# Patient Record
Sex: MALE | Race: WHITE | NOT HISPANIC OR LATINO | Employment: UNEMPLOYED | URBAN - METROPOLITAN AREA
[De-identification: names, ages, dates, MRNs, and addresses within clinical notes are randomized per-mention and may not be internally consistent; named-entity substitution may affect disease eponyms.]

---
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
## 2022-06-23 ENCOUNTER — OFFICE VISIT (OUTPATIENT)
Dept: INTERNAL MEDICINE | Facility: CLINIC | Age: 22
End: 2022-06-23
Payer: COMMERCIAL

## 2022-06-23 ENCOUNTER — LAB VISIT (OUTPATIENT)
Dept: LAB | Facility: HOSPITAL | Age: 22
End: 2022-06-23
Attending: INTERNAL MEDICINE
Payer: COMMERCIAL

## 2022-06-23 VITALS
OXYGEN SATURATION: 99 % | WEIGHT: 237.19 LBS | BODY MASS INDEX: 31.91 KG/M2 | HEART RATE: 54 BPM | DIASTOLIC BLOOD PRESSURE: 78 MMHG | TEMPERATURE: 97 F | SYSTOLIC BLOOD PRESSURE: 108 MMHG

## 2022-06-23 DIAGNOSIS — Z23 NEED FOR DIPHTHERIA-TETANUS-PERTUSSIS (TDAP) VACCINE: ICD-10-CM

## 2022-06-23 DIAGNOSIS — Z00.00 ROUTINE GENERAL MEDICAL EXAMINATION AT A HEALTH CARE FACILITY: Primary | ICD-10-CM

## 2022-06-23 DIAGNOSIS — Z00.00 ROUTINE GENERAL MEDICAL EXAMINATION AT A HEALTH CARE FACILITY: ICD-10-CM

## 2022-06-23 PROBLEM — M62.82 RHABDOMYOLYSIS: Status: RESOLVED | Noted: 2017-08-09 | Resolved: 2022-06-23

## 2022-06-23 PROBLEM — E87.1 HYPONATREMIA: Status: RESOLVED | Noted: 2017-08-08 | Resolved: 2022-06-23

## 2022-06-23 LAB
ALBUMIN SERPL BCP-MCNC: 4.4 G/DL (ref 3.5–5.2)
ALP SERPL-CCNC: 53 U/L (ref 55–135)
ALT SERPL W/O P-5'-P-CCNC: 59 U/L (ref 10–44)
ANION GAP SERPL CALC-SCNC: 10 MMOL/L (ref 8–16)
AST SERPL-CCNC: 30 U/L (ref 10–40)
BASOPHILS # BLD AUTO: 0.07 K/UL (ref 0–0.2)
BASOPHILS NFR BLD: 1.2 % (ref 0–1.9)
BILIRUB SERPL-MCNC: 0.7 MG/DL (ref 0.1–1)
BUN SERPL-MCNC: 11 MG/DL (ref 6–20)
CALCIUM SERPL-MCNC: 9.5 MG/DL (ref 8.7–10.5)
CHLORIDE SERPL-SCNC: 107 MMOL/L (ref 95–110)
CHOLEST SERPL-MCNC: 271 MG/DL (ref 120–199)
CHOLEST/HDLC SERPL: 6.5 {RATIO} (ref 2–5)
CO2 SERPL-SCNC: 25 MMOL/L (ref 23–29)
CREAT SERPL-MCNC: 1 MG/DL (ref 0.5–1.4)
DIFFERENTIAL METHOD: ABNORMAL
EOSINOPHIL # BLD AUTO: 0.5 K/UL (ref 0–0.5)
EOSINOPHIL NFR BLD: 8.5 % (ref 0–8)
ERYTHROCYTE [DISTWIDTH] IN BLOOD BY AUTOMATED COUNT: 12.8 % (ref 11.5–14.5)
EST. GFR  (AFRICAN AMERICAN): >60 ML/MIN/1.73 M^2
EST. GFR  (NON AFRICAN AMERICAN): >60 ML/MIN/1.73 M^2
GLUCOSE SERPL-MCNC: 86 MG/DL (ref 70–110)
HCT VFR BLD AUTO: 47.3 % (ref 40–54)
HDLC SERPL-MCNC: 42 MG/DL (ref 40–75)
HDLC SERPL: 15.5 % (ref 20–50)
HGB BLD-MCNC: 15.4 G/DL (ref 14–18)
IMM GRANULOCYTES # BLD AUTO: 0 K/UL (ref 0–0.04)
IMM GRANULOCYTES NFR BLD AUTO: 0 % (ref 0–0.5)
LDLC SERPL CALC-MCNC: 184.4 MG/DL (ref 63–159)
LYMPHOCYTES # BLD AUTO: 2.4 K/UL (ref 1–4.8)
LYMPHOCYTES NFR BLD: 41.4 % (ref 18–48)
MCH RBC QN AUTO: 30.3 PG (ref 27–31)
MCHC RBC AUTO-ENTMCNC: 32.6 G/DL (ref 32–36)
MCV RBC AUTO: 93 FL (ref 82–98)
MONOCYTES # BLD AUTO: 0.4 K/UL (ref 0.3–1)
MONOCYTES NFR BLD: 7.1 % (ref 4–15)
NEUTROPHILS # BLD AUTO: 2.4 K/UL (ref 1.8–7.7)
NEUTROPHILS NFR BLD: 41.8 % (ref 38–73)
NONHDLC SERPL-MCNC: 229 MG/DL
NRBC BLD-RTO: 0 /100 WBC
PLATELET # BLD AUTO: 337 K/UL (ref 150–450)
PMV BLD AUTO: 9.7 FL (ref 9.2–12.9)
POTASSIUM SERPL-SCNC: 4.4 MMOL/L (ref 3.5–5.1)
PROT SERPL-MCNC: 7.1 G/DL (ref 6–8.4)
RBC # BLD AUTO: 5.08 M/UL (ref 4.6–6.2)
SODIUM SERPL-SCNC: 142 MMOL/L (ref 136–145)
TRIGL SERPL-MCNC: 223 MG/DL (ref 30–150)
TSH SERPL DL<=0.005 MIU/L-ACNC: 2.13 UIU/ML (ref 0.4–4)
WBC # BLD AUTO: 5.77 K/UL (ref 3.9–12.7)

## 2022-06-23 PROCEDURE — 3074F PR MOST RECENT SYSTOLIC BLOOD PRESSURE < 130 MM HG: ICD-10-PCS | Mod: CPTII,S$GLB,, | Performed by: INTERNAL MEDICINE

## 2022-06-23 PROCEDURE — 99999 PR PBB SHADOW E&M-EST. PATIENT-LVL III: CPT | Mod: PBBFAC,,, | Performed by: INTERNAL MEDICINE

## 2022-06-23 PROCEDURE — 3078F DIAST BP <80 MM HG: CPT | Mod: CPTII,S$GLB,, | Performed by: INTERNAL MEDICINE

## 2022-06-23 PROCEDURE — 80061 LIPID PANEL: CPT | Performed by: INTERNAL MEDICINE

## 2022-06-23 PROCEDURE — 3008F PR BODY MASS INDEX (BMI) DOCUMENTED: ICD-10-PCS | Mod: CPTII,S$GLB,, | Performed by: INTERNAL MEDICINE

## 2022-06-23 PROCEDURE — 90471 IMMUNIZATION ADMIN: CPT | Mod: S$GLB,,, | Performed by: INTERNAL MEDICINE

## 2022-06-23 PROCEDURE — 85025 COMPLETE CBC W/AUTO DIFF WBC: CPT | Performed by: INTERNAL MEDICINE

## 2022-06-23 PROCEDURE — 99385 PREV VISIT NEW AGE 18-39: CPT | Mod: 25,S$GLB,, | Performed by: INTERNAL MEDICINE

## 2022-06-23 PROCEDURE — 86803 HEPATITIS C AB TEST: CPT | Performed by: INTERNAL MEDICINE

## 2022-06-23 PROCEDURE — 3008F BODY MASS INDEX DOCD: CPT | Mod: CPTII,S$GLB,, | Performed by: INTERNAL MEDICINE

## 2022-06-23 PROCEDURE — 90715 TDAP VACCINE 7 YRS/> IM: CPT | Mod: S$GLB,,, | Performed by: INTERNAL MEDICINE

## 2022-06-23 PROCEDURE — 3074F SYST BP LT 130 MM HG: CPT | Mod: CPTII,S$GLB,, | Performed by: INTERNAL MEDICINE

## 2022-06-23 PROCEDURE — 87389 HIV-1 AG W/HIV-1&-2 AB AG IA: CPT | Performed by: INTERNAL MEDICINE

## 2022-06-23 PROCEDURE — 3078F PR MOST RECENT DIASTOLIC BLOOD PRESSURE < 80 MM HG: ICD-10-PCS | Mod: CPTII,S$GLB,, | Performed by: INTERNAL MEDICINE

## 2022-06-23 PROCEDURE — 1159F PR MEDICATION LIST DOCUMENTED IN MEDICAL RECORD: ICD-10-PCS | Mod: CPTII,S$GLB,, | Performed by: INTERNAL MEDICINE

## 2022-06-23 PROCEDURE — 99999 PR PBB SHADOW E&M-EST. PATIENT-LVL III: ICD-10-PCS | Mod: PBBFAC,,, | Performed by: INTERNAL MEDICINE

## 2022-06-23 PROCEDURE — 36415 COLL VENOUS BLD VENIPUNCTURE: CPT | Performed by: INTERNAL MEDICINE

## 2022-06-23 PROCEDURE — 90715 TDAP VACCINE GREATER THAN OR EQUAL TO 7YO IM: ICD-10-PCS | Mod: S$GLB,,, | Performed by: INTERNAL MEDICINE

## 2022-06-23 PROCEDURE — 99385 PR PREVENTIVE VISIT,NEW,18-39: ICD-10-PCS | Mod: 25,S$GLB,, | Performed by: INTERNAL MEDICINE

## 2022-06-23 PROCEDURE — 1159F MED LIST DOCD IN RCRD: CPT | Mod: CPTII,S$GLB,, | Performed by: INTERNAL MEDICINE

## 2022-06-23 PROCEDURE — 90471 TDAP VACCINE GREATER THAN OR EQUAL TO 7YO IM: ICD-10-PCS | Mod: S$GLB,,, | Performed by: INTERNAL MEDICINE

## 2022-06-23 PROCEDURE — 84443 ASSAY THYROID STIM HORMONE: CPT | Performed by: INTERNAL MEDICINE

## 2022-06-23 PROCEDURE — 80053 COMPREHEN METABOLIC PANEL: CPT | Performed by: INTERNAL MEDICINE

## 2022-06-23 NOTE — PROGRESS NOTES
Subjective:      Patient ID: Arnulfo Cox is a 22 y.o. male.    Chief Complaint: Establish Care    HPI     23 yo with   Patient Active Problem List   Diagnosis    Body mass index, pediatric, greater than or equal to 95th percentile for age     Past Medical History:   Diagnosis Date    Asthma     inactive    Attention or concentration deficit     Enamel hypoplasia     Hyponatremia 8/8/2017    Polyp of colon     s/p polypectomy - beingn    Rhabdomyolysis     Rhabdomyolysis 8/9/2017    Tonsillitis, chronic     missed 20 days of school [id number], exam by ENT negative, allergy testing negative    Vertigo     seen by neurology, Benign positional vertigo with suspected behavioral amplification- possible migraines.     Here today for annual prev exam.  Compliant with meds without significant side effects. Energy and appetite are good.     Past Surgical History:   Procedure Laterality Date    CIRCUMCISION       Social History     Socioeconomic History    Marital status: Single   Tobacco Use    Smoking status: Never Smoker    Smokeless tobacco: Never Used   Substance and Sexual Activity    Alcohol use: No    Drug use: No   Social History Narrative    Parents both physicians.    FH: donor egg, dad's sperm    SH:Lives with mom and sister, 1 dog, 1 cat, 2 rabbits, no smokers             family history includes ADD / ADHD in his father; Diabetes in his father and paternal grandfather.    No f/h colon nor prostate cancer.     Review of Systems   Constitutional: Negative for chills and fever.   HENT: Negative for ear pain and sore throat.    Respiratory: Negative for cough.    Cardiovascular: Negative for chest pain.   Gastrointestinal: Negative for abdominal pain and blood in stool.   Genitourinary: Negative for dysuria and hematuria.   Neurological: Negative for seizures and syncope.     Objective:   /78 (BP Location: Left arm, Patient Position: Sitting, BP Method: Large (Manual))   Pulse (!) 54    Temp 96.8 °F (36 °C) (Tympanic)   Wt 107.6 kg (237 lb 3.4 oz)   SpO2 99%   BMI 31.91 kg/m²     Physical Exam  Constitutional:       General: He is not in acute distress.     Appearance: He is well-developed.   HENT:      Head: Normocephalic and atraumatic.   Eyes:      Pupils: Pupils are equal, round, and reactive to light.   Neck:      Thyroid: No thyromegaly.   Cardiovascular:      Rate and Rhythm: Normal rate and regular rhythm.   Pulmonary:      Breath sounds: Normal breath sounds. No wheezing or rales.   Abdominal:      General: Bowel sounds are normal.      Palpations: Abdomen is soft.      Tenderness: There is no abdominal tenderness.   Musculoskeletal:      Cervical back: Neck supple.   Lymphadenopathy:      Cervical: No cervical adenopathy.   Skin:     General: Skin is warm and dry.   Neurological:      Mental Status: He is alert and oriented to person, place, and time.   Psychiatric:         Behavior: Behavior normal.         Assessment:     1. Routine general medical examination at a health care facility    2. Need for diphtheria-tetanus-pertussis (Tdap) vaccine      Plan:   Routine general medical examination at a health care facility  -     Comprehensive Metabolic Panel; Future; Expected date: 06/23/2022  -     CBC Auto Differential; Future; Expected date: 06/23/2022  -     TSH; Future; Expected date: 06/23/2022  -     Lipid Panel; Future; Expected date: 06/23/2022  -     Hepatitis C Antibody; Future; Expected date: 06/23/2022  -     HIV 1/2 Ag/Ab (4th Gen); Future; Expected date: 06/23/2022    Need for diphtheria-tetanus-pertussis (Tdap) vaccine  -     Tdap Vaccine      Heart healthy diet and reg exercise   reviewed        Problem List Items Addressed This Visit    None     Visit Diagnoses     Routine general medical examination at a health care facility    -  Primary    Relevant Orders    Comprehensive Metabolic Panel    CBC Auto Differential    TSH    Lipid Panel    Hepatitis C Antibody    HIV  1/2 Ag/Ab (4th Gen)    Need for diphtheria-tetanus-pertussis (Tdap) vaccine        Relevant Orders    Tdap Vaccine (Completed)          Follow up in about 1 year (around 6/23/2023), or if symptoms worsen or fail to improve.

## 2022-06-24 ENCOUNTER — PATIENT MESSAGE (OUTPATIENT)
Dept: INTERNAL MEDICINE | Facility: CLINIC | Age: 22
End: 2022-06-24
Payer: COMMERCIAL

## 2022-06-24 ENCOUNTER — TELEPHONE (OUTPATIENT)
Dept: INTERNAL MEDICINE | Facility: CLINIC | Age: 22
End: 2022-06-24
Payer: COMMERCIAL

## 2022-06-24 DIAGNOSIS — R41.840 ATTENTION DEFICIT: Primary | ICD-10-CM

## 2022-06-24 LAB
HCV AB SERPL QL IA: NEGATIVE
HIV 1+2 AB+HIV1 P24 AG SERPL QL IA: NEGATIVE

## 2022-06-24 NOTE — TELEPHONE ENCOUNTER
----- Message from Margarita Hernandez sent at 6/24/2022  1:52 PM CDT -----  Contact: self/ [phone]  Pt request referral for psych.Please call back at [phone].Thanks ar

## 2022-07-05 ENCOUNTER — TELEPHONE (OUTPATIENT)
Dept: PSYCHIATRY | Facility: CLINIC | Age: 22
End: 2022-07-05
Payer: COMMERCIAL

## 2022-07-05 NOTE — TELEPHONE ENCOUNTER
----- Message from Ediliadebora Francis sent at 7/5/2022  1:15 PM CDT -----  Contact: NICKI SMALLWOOD [[id number]]  .Type:  Needs Medical Advice    Who Called: NICKI SMALLWOOD [[id number]]  Would the patient rather a call back or a response via [facility]? Call   Best Call Back Number: .[phone] (home)    Additional Information: Pt is req a call back in regards to having an appointment scheduled to be screened for ADHD there is a referral for pcp.. Thanks AW

## 2023-02-22 NOTE — ED NOTES
Theo was seen today for fever.    Diagnoses and all orders for this visit:    Tonsillitis  -     Streptococcus A Rapid Screen w/Reflex to PCR - Clinic Collect  -     Group A Streptococcus PCR Throat Swab       I discussed with the patient and parent(s) that this likely viral illness does not require antibiotic treatment. There is no evidence of Strep throat or other serious bacterial infection on history, exam and testing today. Mom declines COVID swab. The child is well-hydrated and nontoxic in appearance. Supportive treatment is recommended, including Tylenol and/or Ibuprofen as needed for fever or pain, push fluids and monitor hydration. The patient and parent(s) are encouraged to call the clinic or the 24-hour nurse hotline with any questions or concerns.      Subjective   Theo is a 6 year old accompanied by his mother, presenting for the following health issues:  Fever      Fever  Associated symptoms include a fever.   History of Present Illness       Reason for visit:  Fever  Symptom onset:  Today  Symptoms include:  Fever this morning, no cough, no earache, no runny nose, lymph nodes on left side enlarged and painful when touching  Symptom intensity:  Moderate  Symptom progression:  Staying the same  What makes it better:  Tylenol      Fever noted this morning, resolved with Tylenol.   Cervical nodes are enlarged, tender.   Eating, drinking well. No vomiting.         Review of Systems   Constitutional: Positive for fever.   Remainder of 10-system review is normal other than as noted above.          Objective    BP 98/56   Pulse 104   Temp 99.9  F (37.7  C) (Temporal)   Resp 22   Wt 17.4 kg (38 lb 6.4 oz)   SpO2 100%   2 %ile (Z= -2.11) based on CDC (Boys, 2-20 Years) weight-for-age data using vitals from 2/22/2023.  No height on file for this encounter.    Physical Exam   GENERAL: Active, alert, in no acute distress.  SKIN: Clear. No significant rash, abnormal pigmentation or lesions  HEAD:  APPEARANCE: Pt appears anxious. Patient has clean hair, skin and nails. Clothing is appropriate and properly fastened.   NEURO: Awake, alert, appropriate for age, and cooperative with a calm affect; pupils equal and round.  HEENT: Head symmetrical. Bilateral eyes without redness or drainage. Bilateral ears without drainage. Bilateral nares patent without drainage.  CARDIAC: Regular rate, hypertensive.  RESPIRATORY: Airway is open and patent. Respirations are spontaneous on room air. Normal respiratory effort and rate noted.  GI/: Abdomen soft and non-distended. Patient is reported to vomit multiple times today. Pt reports decreased urine output.  NEUROVASCULAR: All extremities are cool and pink with +2 pulses and capillary refill less than 3 seconds.  MUSCULOSKELETAL: Moves all extremities well, needs assistance with ambulation and standing. Arthralgia and myalgia noted.  SKIN: Warm and dry, adequate turgor, mucus membranes moist and pink; no breakdown, lesions, or ecchymosis noted.   SOCIAL: Patient is accompanied by father and pt's .   Will continue to monitor.     Normocephalic.  EYES:  No discharge or erythema. Normal pupils and EOM.  EARS: Normal canals. Tympanic membranes are normal; gray and translucent.  NOSE: Normal without discharge.  MOUTH/THROAT: erythema on the oropharynx, tonsillar hypertrophy 2+.   NECK: Supple, no masses.  LYMPH NODES: R anterior nontender cervical adenopathy.   LUNGS: Clear. No rales, rhonchi, wheezing or retractions  HEART: Regular rhythm. Normal S1/S2. No murmurs.  ABDOMEN: Soft, non-tender, not distended, no masses or hepatosplenomegaly. Bowel sounds normal.     Diagnostics:   Recent Results (from the past 24 hour(s))   Streptococcus A Rapid Screen w/Reflex to PCR - Clinic Collect    Collection Time: 02/22/23  1:06 PM    Specimen: Throat; Swab   Result Value Ref Range    Group A Strep antigen Negative Negative

## 2023-06-23 ENCOUNTER — TELEPHONE (OUTPATIENT)
Dept: INTERNAL MEDICINE | Facility: CLINIC | Age: 23
End: 2023-06-23
Payer: COMMERCIAL

## 2023-07-28 ENCOUNTER — PATIENT OUTREACH (OUTPATIENT)
Dept: ADMINISTRATIVE | Facility: HOSPITAL | Age: 23
End: 2023-07-28
Payer: COMMERCIAL

## 2023-11-02 ENCOUNTER — PATIENT OUTREACH (OUTPATIENT)
Dept: ADMINISTRATIVE | Facility: HOSPITAL | Age: 23
End: 2023-11-02
Payer: COMMERCIAL

## 2023-11-02 ENCOUNTER — PATIENT MESSAGE (OUTPATIENT)
Dept: ADMINISTRATIVE | Facility: HOSPITAL | Age: 23
End: 2023-11-02
Payer: COMMERCIAL

## 2024-01-18 ENCOUNTER — PATIENT OUTREACH (OUTPATIENT)
Dept: ADMINISTRATIVE | Facility: HOSPITAL | Age: 24
End: 2024-01-18
Payer: COMMERCIAL

## 2024-01-18 NOTE — PROGRESS NOTES
PCP VISIT > 12 MONTHS: per chart review pt is overdue for annual visit, spoke to pt he has Moved to [place] and will be looking for another provider.

## 2024-10-10 ENCOUNTER — OFFICE VISIT (OUTPATIENT)
Dept: PRIMARY CARE CLINIC | Facility: CLINIC | Age: 24
End: 2024-10-10
Payer: COMMERCIAL

## 2024-10-10 VITALS
OXYGEN SATURATION: 99 % | BODY MASS INDEX: 29.77 KG/M2 | HEIGHT: 72 IN | SYSTOLIC BLOOD PRESSURE: 130 MMHG | TEMPERATURE: 98 F | HEART RATE: 60 BPM | RESPIRATION RATE: 20 BRPM | DIASTOLIC BLOOD PRESSURE: 78 MMHG | WEIGHT: 219.81 LBS

## 2024-10-10 DIAGNOSIS — Z00.00 ROUTINE MEDICAL EXAM: Primary | ICD-10-CM

## 2024-10-10 DIAGNOSIS — R00.2 HEART PALPITATIONS: ICD-10-CM

## 2024-10-10 PROCEDURE — 93010 ELECTROCARDIOGRAM REPORT: CPT | Mod: S$GLB,,, | Performed by: INTERNAL MEDICINE

## 2024-10-10 PROCEDURE — 93005 ELECTROCARDIOGRAM TRACING: CPT | Mod: S$GLB,,, | Performed by: NURSE PRACTITIONER

## 2024-10-10 PROCEDURE — 99999 PR PBB SHADOW E&M-EST. PATIENT-LVL IV: CPT | Mod: PBBFAC,,, | Performed by: NURSE PRACTITIONER

## 2024-10-10 RX ORDER — PROPRANOLOL HYDROCHLORIDE 10 MG/1
10 TABLET ORAL DAILY PRN
COMMUNITY

## 2024-10-10 NOTE — PROGRESS NOTES
"[facility] Note    Chief Complaint      Chief Complaint   Patient presents with    Annual Exam    Establish Care       History of Present Illness      Arnulfo Cox is a 24 y.o. male who presents today for   Chief Complaint   Patient presents with    Annual Exam    Establish Care         Patient is new to me.  He presents to clinic for follow-up visit after visiting the ER on 10/09/24 for her palpitations.  Unable to view the EKG from this visit as it is not available to be seen.  CBC and CMP were normal.  Patient reports palpitations or feeling of "heart dropping" began proximally 5 days ago.  He reports drinking alcohol the evening before but had been hydrating since.  He denies any trauma to his chest.  He denies any syncope, vomiting, nausea.  He denies any shortness a breath at this time.  He reports feeling an overall sense of weakness.         Review of Systems   Constitutional:  Positive for malaise/fatigue.   Cardiovascular:  Positive for palpitations.   All 12 systems otherwise negative.       Family History:  family history includes ADD / ADHD in his father; Dementia in his maternal grandfather; Diabetes in his father; No Known Problems in his maternal grandmother, paternal grandfather, and paternal grandmother; Parkinsonism in his mother.   Family history was reviewed with patient.     Medications:  Outpatient Encounter Medications as of 10/10/2024   Medication Sig Dispense Refill    propranoloL (INDERAL) 10 MG tablet Take 10 mg by mouth daily as needed.      dexmethylphenidate (FOCALIN XR) 15 MG 24 hr capsule Take 1 capsule by mouth in the morning (Patient not taking: Reported on 10/10/2024) 30 capsule 0     No facility-administered encounter medications on file as of 10/10/2024.       Allergies:  Review of patient's allergies indicates:  No Known Allergies    Health Maintenance:  Health Maintenance   Topic Date Due    TETANUS VACCINE  06/14/2034    DTaP/Tdap/Td Vaccines (9 - Td or " Tdap) 06/14/2034    Hepatitis C Screening  Completed    Hib Vaccines  Completed    Hepatitis B Vaccines  Completed    IPV Vaccines  Completed    Lipid Panel  Completed    Hepatitis A Vaccines  Completed    MMR Vaccines  Completed    Varicella Vaccines  Completed    Meningococcal Vaccine  Completed    HPV Vaccines  Completed     Health Maintenance Topics with due status: Not Due       Topic Last Completion Date    TETANUS VACCINE 06/14/2024    DTaP/Tdap/Td Vaccines 06/14/2024    RSV Vaccine (Age 60+ and Pregnant patients) Not Due       Physical Exam      Vital Signs  Temp: 97.6 °F (36.4 °C)  Temp Source: Oral  Pulse: 60  Resp: 20  SpO2: 99 %  BP: 130/78  Pain Score: 0-No pain  Height and Weight  Height: 6' (182.9 cm)  Weight: 99.7 kg (219 lb 12.8 oz)  BSA (Calculated - sq m): 2.25 sq meters  BMI (Calculated): 29.8  Weight in (lb) to have BMI = 25: 183.9]    Physical Exam  Vitals reviewed.   Constitutional:       Appearance: Normal appearance. He is normal weight.   HENT:      Head: Normocephalic and atraumatic.      Right Ear: Tympanic membrane, ear canal and external ear normal.      Left Ear: Tympanic membrane, ear canal and external ear normal.      Nose: Nose normal.      Mouth/Throat:      Mouth: Mucous membranes are moist.      Pharynx: Oropharynx is clear.   Eyes:      Extraocular Movements: Extraocular movements intact.      Conjunctiva/sclera: Conjunctivae normal.      Pupils: Pupils are equal, round, and reactive to light.   Cardiovascular:      Rate and Rhythm: Normal rate and regular rhythm.      Pulses: Normal pulses.      Heart sounds: Normal heart sounds.   Pulmonary:      Effort: Pulmonary effort is normal.      Breath sounds: Normal breath sounds.   Abdominal:      General: Abdomen is flat. Bowel sounds are normal.      Palpations: Abdomen is soft.   Musculoskeletal:         General: Normal range of motion.      Cervical back: Normal range of motion and neck supple.   Skin:     General: Skin is warm  and dry.      Capillary Refill: Capillary refill takes less than 2 seconds.   Neurological:      General: No focal deficit present.      Mental Status: He is alert and oriented to person, place, and time. Mental status is at baseline.   Psychiatric:         Mood and Affect: Mood normal.         Behavior: Behavior normal.         Thought Content: Thought content normal.         Judgment: Judgment normal.            Assessment/Plan     Arnulfo Cox is a 24 y.o.male with:    Routine medical exam  -     Lipid Panel; Future; Expected date: 10/10/2024  -     Hemoglobin A1C; Future; Expected date: 10/10/2024  -     T4, Free; Future; Expected date: 10/10/2024  -     TSH; Future; Expected date: 10/10/2024    Heart palpitations  -     Stress Echo Which stress agent will be used? Treadmill Exercise; Color Flow Doppler? No; Future  -     Ambulatory referral/consult to Cardiology; Future; Expected date: 10/17/2024  -     IN OFFICE EKG 12-LEAD (to Muse)        As above, continue current medications and maintain follow up with specialists.  Return to clinic as needed.    Greater than 50% of visit was spent face to face with patient.  All questions were answered to patient's satisfaction.          Karen L Spencer, NP-C [facility]

## 2024-10-11 ENCOUNTER — PATIENT MESSAGE (OUTPATIENT)
Dept: PRIMARY CARE CLINIC | Facility: CLINIC | Age: 24
End: 2024-10-11
Payer: COMMERCIAL

## 2024-10-11 LAB
OHS QRS DURATION: 112 MS
OHS QTC CALCULATION: 375 MS

## 2024-10-11 NOTE — TELEPHONE ENCOUNTER
Pt has an appt with Cardiology on 10/25, But had another episode today. He wants to get a 24 hr Holter monitor or does he just wait for his appt with the cardiologist ?

## 2024-10-14 DIAGNOSIS — E78.2 MIXED DYSLIPIDEMIA: Primary | ICD-10-CM

## 2024-10-25 ENCOUNTER — OFFICE VISIT (OUTPATIENT)
Dept: CARDIOLOGY | Facility: CLINIC | Age: 24
End: 2024-10-25
Payer: COMMERCIAL

## 2024-10-25 ENCOUNTER — CLINICAL SUPPORT (OUTPATIENT)
Dept: CARDIOLOGY | Facility: HOSPITAL | Age: 24
End: 2024-10-25
Attending: INTERNAL MEDICINE
Payer: COMMERCIAL

## 2024-10-25 VITALS
BODY MASS INDEX: 29.48 KG/M2 | DIASTOLIC BLOOD PRESSURE: 88 MMHG | WEIGHT: 217.38 LBS | SYSTOLIC BLOOD PRESSURE: 133 MMHG | HEART RATE: 65 BPM

## 2024-10-25 DIAGNOSIS — R00.2 HEART PALPITATIONS: Primary | ICD-10-CM

## 2024-10-25 DIAGNOSIS — R00.2 HEART PALPITATIONS: ICD-10-CM

## 2024-10-25 DIAGNOSIS — R00.2 PALPITATIONS: ICD-10-CM

## 2024-10-25 PROCEDURE — 99999 PR PBB SHADOW E&M-EST. PATIENT-LVL III: CPT | Mod: PBBFAC,,, | Performed by: INTERNAL MEDICINE

## 2024-10-25 PROCEDURE — 93242 EXT ECG>48HR<7D RECORDING: CPT

## 2024-10-25 PROCEDURE — 93244 EXT ECG>48HR<7D REV&INTERPJ: CPT | Mod: ,,, | Performed by: INTERNAL MEDICINE

## 2024-10-25 RX ORDER — LANOLIN ALCOHOL/MO/W.PET/CERES
CREAM (GRAM) TOPICAL
Start: 2024-10-25

## 2024-10-25 NOTE — PROGRESS NOTES
Subjective:   10/25/2024     Patient ID:  Arnulfo Cox is a 24 y.o. male who presents for evaulation of Palpitations      Patient comes in with palpitations, he notes these are skipped beats, they began several weeks ago.  He was seen in the emergency room.  Workup there was unremarkable.  Studies reviewed.  He was seen by his PCP, thyroid function studies normal.  He was scheduled for a stress echo.    Family history is negative for sudden death, positive hypertension.    Fairly marked hyperlipidemia present.  LDL cholesterol previously 184.    He is physically active, runs daily.      ER workup:     History obtained from patient  This is a 24 y.o. male presents with Palpitations  . Patient presents with palpitations that began this weekend. He describes pauses in his heartbeat. He states when he had these pauses he felt short of breath. This occurred Saturday. He would not have any episodes Sunday or Monday however had episodes again today and felt almost near syncopal with it so he opted to come to the emergency department. He does not have a primary care doctor but with seeking to find 1 when they told him to come to the emergency department. He denies any chest pain. No shortness of breath this time.    Recent Results (from the past 24 hour(s))  EKG 12 Lead - Palpitations  Collection Time: 10/09/24 8:25 PM  Result Value Ref Range  VENTRICULAR RATE 98 BPM  ATRIAL RATE 98 BPM  P-R INTERVAL 162 ms  QRS DURATION 106 ms  Q-T INTERVAL 368 ms  QTC CALCULATION(BEZET) 469 ms  P AXIS 66 degrees  R AXIS 21 degrees  T AXIS 48 degrees  INTERPRETATION (MUSE)  Normal sinus rhythm with sinus arrhythmia Normal ECG No previous ECGs available Confirmed by Mohsen, Ala ([id number]) on 10/10/2024 4:54:58 PM  CBC (no diff)  Collection Time: 10/09/24 8:43 PM  Result Value Ref Range  WBC 9.7 4.5 - 11.0 103/uL  RBC 4.85 4.50 - 5.90 106/uL  Hemoglobin 15.0 13.5 - 17.5 gm/dL  Hematocrit 43.3 40.0 - 51.0 %  MCV 89.4 80.0 - 100.0 fL  MCH  30.9 26.0 - 34.0 pg  MCHC 34.6 31.0 - <37.0 g/dL  RDW 13.1 11.5 - 14.5 %  Platelet Count 304 130 - 400 103/uL  MPV 7.7 7.4 - 10.4 fL  MDW  CMP  Collection Time: 10/09/24 8:43 PM  Result Value Ref Range  Sodium 139 135 - 146 mmol/L  Potassium 3.6 3.6 - 5.2 mmol/L  Chloride 102 96 - 110 mmol/L  Carbon Dioxide 26 24 - 32 mmol/L  Glucose 104 (H) 65 - 99 mg/dL  Calcium 9.2 8.4 - 10.3 mg/dL  BUN 13.0 7.0 - 25.0 mg/dL  Creatinine 1.16 0.70 - 1.40 mg/dL  Total Protein 7.3 6.0 - 8.0 g/dL  Albumin 4.8 3.4 - 5.0 g/dL  AST 26 <45 U/L  ALT 23 <46 U/L  Alkaline Phosphatase 53 20 - 120 U/L  Bilirubin, Total 0.8 <1.3 mg/dL  EGFR 90 >=90 mL/min/1.73m2  Anion Gap 11 8 - 16  Magnesium  Collection Time: 10/09/24 8:43 PM  Result Value Ref Range  Magnesium 2.2 1.5 - 2.6 mg/dL  D-Dimer, Quantitative  Collection Time: 10/09/24 8:43 PM  Result Value Ref Range  D-Dimer 0.27 <0.50 ug/mL FEU    Medications - No data to display    Imaging: the following images were reviewed and interpreted by the radiologist    XR Chest 1 View Portable  Final Result  Impression: Enlarged cardiac silhouette, cardiomegaly versus artifact from portable technique. No acute pulmonary disease.    Electronically Signed By: Juan M Blackman MD 10/9/2024 9:09 PM CDT      Procedures    MDM    Medical Decision Making    This is an emergent evaluation of a 24 y.o. year old who presents to the emergency department with palpitations. EKG is sinus rhythm with sinus arrhythmia. Troponin is negative. D-dimer is negative. He is hypertensive here. No reported history. Needs to follow up regarding this. Creatinine 1.16 without previous. Patient is following up with PCP.    Previous records were queried and reviewed. Including office note 10/24/2023 for anxiety disorder    Differential diagnosis includes but is not limited to: Life-threatening arrhythmia sinus arrhythmia sinus pause PE    Pulse oximetry obtained and was: 99 %. I interpreted that as normal.    Cardiac monitor shows  NSR at 103 which I interpret to be normal. Monitoring ordered to evaluate for arrhythmias. No ectopy.    Labs were significant for:    Recent Labs  10/09/24  2043  HGB 15.0  HEMATOCRIT 43.3  WBC 9.7  CREATININE 1.16    Imaging: I independently interpreted the x-ray images which showed no consolidation        ED Course    ED Course as of 10/10/24 1745  Wed Oct 09, 2024  2034 EKG interpreted by me normal sinus rhythm at a rate of 98   no ST changes [LH]  2133 D Dimer: 0.27 [LH]    ED Course User Index  [LH] Trisha Davis MD      Clinical Impressions as of 10/10/24 1745  Sinus arrhythmia  Palpitations        Impression    1. Sinus arrhythmia  2. Palpitations          Past Medical History:   Diagnosis Date    Asthma     inactive    Attention or concentration deficit     Enamel hypoplasia     Hyponatremia 8/8/2017    Polyp of colon     s/p polypectomy - beingn    Rhabdomyolysis     Rhabdomyolysis 8/9/2017    Tonsillitis, chronic     missed 20 days of school [id number], exam by ENT negative, allergy testing negative    Vertigo     seen by neurology, Benign positional vertigo with suspected behavioral amplification- possible migraines.       Review of patient's allergies indicates:  No Known Allergies      Current Outpatient Medications:     propranoloL (INDERAL) 10 MG tablet, Take 10 mg by mouth daily as needed., Disp: , Rfl:      Objective:   Review of Systems   Cardiovascular:  Positive for irregular heartbeat, near-syncope and palpitations. Negative for chest pain, claudication, cyanosis, dyspnea on exertion, leg swelling, orthopnea, paroxysmal nocturnal dyspnea and syncope.         Vitals:    10/25/24 0810   BP: 133/88   Pulse: 65     Wt Readings from Last 3 Encounters:   10/25/24 98.6 kg (217 lb 6 oz)   10/10/24 99.7 kg (219 lb 12.8 oz)   06/23/22 107.6 kg (237 lb 3.4 oz)     Temp Readings from Last 3 Encounters:   10/10/24 97.6 °F (36.4 °C) (Oral)   06/23/22 96.8 °F (36 °C) (Tympanic)   09/12/18 98 °F  (36.7 °C) (Oral)     BP Readings from Last 3 Encounters:   10/25/24 133/88   10/10/24 130/78   06/23/22 108/78     Pulse Readings from Last 3 Encounters:   10/25/24 65   10/10/24 60   06/23/22 (!) 54             Physical Exam  Vitals reviewed.   Constitutional:       General: He is not in acute distress.     Appearance: He is well-developed.   HENT:      Head: Normocephalic and atraumatic.      Nose: Nose normal.   Eyes:      Conjunctiva/sclera: Conjunctivae normal.      Pupils: Pupils are equal, round, and reactive to light.   Neck:      Vascular: No carotid bruit or JVD.   Cardiovascular:      Rate and Rhythm: Normal rate and regular rhythm.      Pulses: Normal pulses and intact distal pulses.      Heart sounds: Normal heart sounds. No murmur heard.     No friction rub. No gallop.   Pulmonary:      Effort: Pulmonary effort is normal. No respiratory distress.      Breath sounds: Normal breath sounds. No wheezing or rales.   Chest:      Chest wall: No tenderness.   Abdominal:      General: Bowel sounds are normal. There is no distension.      Palpations: Abdomen is soft.      Tenderness: There is no abdominal tenderness.   Musculoskeletal:         General: No tenderness or deformity. Normal range of motion.      Cervical back: Normal range of motion and neck supple.      Right lower leg: No edema.      Left lower leg: No edema.   Skin:     General: Skin is warm and dry.      Findings: No erythema or rash.   Neurological:      Mental Status: He is alert and oriented to person, place, and time.      Cranial Nerves: No cranial nerve deficit.      Motor: No abnormal muscle tone.      Coordination: Coordination normal.   Psychiatric:         Behavior: Behavior normal.         Thought Content: Thought content normal.         Judgment: Judgment normal.           Lab Results   Component Value Date    CHOL 227 (H) 10/14/2024    CHOL 271 (H) 06/23/2022     Lab Results   Component Value Date    HDL 37 (L) 10/14/2024    HDL 42  06/23/2022     Lab Results   Component Value Date    LDLCALC 161.6 (H) 10/14/2024    LDLCALC 184.4 (H) 06/23/2022     Lab Results   Component Value Date    ALT 59 (H) 06/23/2022    AST 30 06/23/2022    AST 57 (H) 08/08/2017     Lab Results   Component Value Date    CREATININE 1.0 06/23/2022    BUN 11 06/23/2022     06/23/2022    K 4.4 06/23/2022    CO2 25 06/23/2022    CO2 26 08/14/2017    CO2 29 08/10/2017     Lab Results   Component Value Date    HGB 15.4 06/23/2022    HCT 47.3 06/23/2022    HCT 41.4 08/08/2017    HCT 40.7 05/23/2011               EKG independent review from October 10th shows sinus bradycardia and is otherwise normal.          Assessment and Plan:     Heart palpitations  Comments:  Will obtain a 3 day monitor to document palpitations   Try to suppress with supplemental magnesium and potassium  Orders:  -     Ambulatory referral/consult to Cardiology         Follow up in about 6 weeks (around 12/6/2024).          No future appointments.

## 2024-10-28 ENCOUNTER — TELEPHONE (OUTPATIENT)
Dept: CARDIOLOGY | Facility: CLINIC | Age: 24
End: 2024-10-28
Payer: COMMERCIAL

## 2024-10-28 ENCOUNTER — PATIENT MESSAGE (OUTPATIENT)
Dept: PRIMARY CARE CLINIC | Facility: CLINIC | Age: 24
End: 2024-10-28
Payer: COMMERCIAL

## 2024-10-28 DIAGNOSIS — R00.2 HEART PALPITATIONS: Primary | ICD-10-CM

## 2024-10-30 ENCOUNTER — APPOINTMENT (OUTPATIENT)
Dept: LAB | Facility: HOSPITAL | Age: 24
End: 2024-10-30
Attending: INTERNAL MEDICINE
Payer: COMMERCIAL

## 2024-11-01 LAB
OHS CV EVENT MONITOR DAY: 2
OHS CV HOLTER HOOKUP DATE: NORMAL
OHS CV HOLTER HOOKUP TIME: NORMAL
OHS CV HOLTER LENGTH DECIMAL HOURS: 51
OHS CV HOLTER LENGTH HOURS: 3
OHS CV HOLTER LENGTH MINUTES: 0
OHS CV HOLTER SCAN DATE: NORMAL
OHS CV HOLTER SINUS AVERAGE HR: 70 BPM
OHS CV HOLTER SINUS MAX HR: 173 BPM
OHS CV HOLTER SINUS MIN HR: 38 BPM
OHS CV HOLTER STUDY END DATE: NORMAL
OHS CV HOLTER STUDY END TIME: NORMAL
